# Patient Record
Sex: MALE | Race: WHITE | NOT HISPANIC OR LATINO | Employment: UNEMPLOYED | URBAN - NONMETROPOLITAN AREA
[De-identification: names, ages, dates, MRNs, and addresses within clinical notes are randomized per-mention and may not be internally consistent; named-entity substitution may affect disease eponyms.]

---

## 2017-02-06 RX ORDER — CLOPIDOGREL BISULFATE 75 MG/1
TABLET ORAL
Qty: 30 TABLET | Refills: 0 | Status: SHIPPED | OUTPATIENT
Start: 2017-02-06 | End: 2017-02-14

## 2017-02-14 ENCOUNTER — OFFICE VISIT (OUTPATIENT)
Dept: CARDIOLOGY | Facility: CLINIC | Age: 52
End: 2017-02-14

## 2017-02-14 VITALS
HEIGHT: 72 IN | SYSTOLIC BLOOD PRESSURE: 118 MMHG | DIASTOLIC BLOOD PRESSURE: 73 MMHG | RESPIRATION RATE: 16 BRPM | HEART RATE: 70 BPM | BODY MASS INDEX: 40.63 KG/M2 | WEIGHT: 300 LBS | OXYGEN SATURATION: 98 %

## 2017-02-14 DIAGNOSIS — I10 ESSENTIAL HYPERTENSION: Primary | ICD-10-CM

## 2017-02-14 DIAGNOSIS — I25.10 ATHEROSCLEROTIC CARDIOVASCULAR DISEASE: ICD-10-CM

## 2017-02-14 DIAGNOSIS — E78.5 DYSLIPIDEMIA: ICD-10-CM

## 2017-02-14 PROCEDURE — 93000 ELECTROCARDIOGRAM COMPLETE: CPT | Performed by: INTERNAL MEDICINE

## 2017-02-14 PROCEDURE — 99213 OFFICE O/P EST LOW 20 MIN: CPT | Performed by: INTERNAL MEDICINE

## 2017-02-14 RX ORDER — ASCORBIC ACID 500 MG
500 TABLET ORAL DAILY
COMMUNITY

## 2017-02-14 NOTE — PROGRESS NOTES
CLAIRE Richards  Eladio Stephenser  1965      Patient Active Problem List   Diagnosis   • Skin lesion of face   • Hyperkeratosis   • Atherosclerotic cardiovascular disease   • Hypertension. CONTIUNE CURRENT MEDICATIONS   • Dyslipidemia. ON PRAVASTATIN       Dear CLAIRE Richards:    Subjective     Eladio Guzman is a 51 y.o. male with the above medical problems who is here today for follow-up.  Going to the patient he has been doing well since his last visit and denies any chest pain, palpitations, orthopnea, PND, lower extremity edema, syncope or dizziness.  He does complain of some shortness of breath on exertion but states this is likely related to his weight gain and recent respiratory infection.  He states he has quit smoking at this point but has since gained several ounces of weight.  Advised him on the importance of weight control and techniques to prevent further weight gain.      Current Outpatient Prescriptions:   •  albuterol (PROVENTIL HFA;VENTOLIN HFA) 108 (90 BASE) MCG/ACT inhaler, Inhale 2 puffs every 4 (four) hours as needed for wheezing., Disp: , Rfl:   •  aspirin 81 MG EC tablet, Take 81 mg by mouth daily., Disp: , Rfl:   •  clopidogrel (PLAVIX) 75 MG tablet, TAKE ONE TABLET BY MOUTH ONCE DAILY, Disp: 30 tablet, Rfl: 0  •  isosorbide mononitrate (IMDUR) 60 MG 24 hr tablet, Take 60 mg by mouth daily., Disp: , Rfl:   •  levothyroxine (SYNTHROID, LEVOTHROID) 25 MCG tablet, Take 25 mcg by mouth daily., Disp: , Rfl:   •  lisinopril (PRINIVIL,ZESTRIL) 10 MG tablet, Take 10 mg by mouth daily., Disp: , Rfl:   •  metoprolol tartrate (LOPRESSOR) 50 MG tablet, Take 50 mg by mouth 2 (two) times a day., Disp: , Rfl:   •  nitroglycerin (NITROSTAT) 0.4 MG SL tablet, Place 0.4 mg under the tongue every 5 (five) minutes as needed for chest pain. Take no more than 3 doses in 15 minutes., Disp: , Rfl:   •  pravastatin (PRAVACHOL) 40 MG tablet, Take 40 mg by mouth daily., Disp: , Rfl:   •  ranitidine  (ZANTAC) 150 MG tablet, Take 150 mg by mouth 2 (two) times a day., Disp: , Rfl:   •  vitamin B-12 (CYANOCOBALAMIN) 1000 MCG tablet, Take 1,000 mcg by mouth daily., Disp: , Rfl:     The following portions of the patient's history were reviewed and updated as appropriate: allergies, current medications, past family history, past medical history, past social history, past surgical history and problem list.    Review of Systems   Constitution: Negative for chills, diaphoresis and fever.   HENT: Positive for congestion. Negative for headaches, nosebleeds and sore throat.    Cardiovascular: Positive for dyspnea on exertion. Negative for chest pain, leg swelling, orthopnea, palpitations and paroxysmal nocturnal dyspnea.   Respiratory: Negative for cough, hemoptysis, shortness of breath and wheezing.    Endocrine: Negative for cold intolerance and heat intolerance.   Hematologic/Lymphatic: Does not bruise/bleed easily.   Skin: Negative for rash.   Musculoskeletal: Positive for arthritis, joint pain, joint swelling and stiffness. Negative for myalgias.   Gastrointestinal: Negative for abdominal pain, constipation, diarrhea, hematemesis, hematochezia, melena, nausea and vomiting.   Genitourinary: Negative for dysuria and hematuria.   Neurological: Negative for dizziness, focal weakness and numbness.   Psychiatric/Behavioral: Negative for depression. The patient is not nervous/anxious.        Objective   There were no vitals taken for this visit.    Physical Exam   Constitutional: He is oriented to person, place, and time. He appears well-developed and well-nourished.   Obese WM sitting comfortably on chair.   HENT:   Mouth/Throat: Oropharynx is clear and moist.   Eyes: EOM are normal. Pupils are equal, round, and reactive to light.   Neck: Neck supple. No JVD present. No tracheal deviation present. No thyromegaly present.   Cardiovascular: Normal rate, regular rhythm, S1 normal and S2 normal.  Exam reveals no gallop and no  friction rub.    No murmur heard.  Pulmonary/Chest: Effort normal and breath sounds normal. No respiratory distress. He has no wheezes. He has no rales.   Abdominal: Soft. Bowel sounds are normal. He exhibits no mass. There is no tenderness.   Musculoskeletal: Normal range of motion. He exhibits no edema.   Lymphadenopathy:     He has no cervical adenopathy.   Neurological: He is alert and oriented to person, place, and time.   Skin: Skin is warm and dry. No rash noted.   Psychiatric: He has a normal mood and affect.       Procedures    Assessment/Plan     1.  ASCVD: Patient with history of ASCVD who currently remains asymptomatic.  He is on aspirin, Plavix, isosorbide mononitrate, beta blocker, ACE inhibitor and statin.  This point we'll continue current regimen.    2.  Dyslipidemia: Patient with a history of dyslipidemia currently on pravastatin.  At this point we will check lipid panel.    3.  Hypertension: Patient with history of hypertension with no recent kidney or liver function evaluation.  We will request CMP.    No diagnosis found.         No Follow-up on file.    I appreciate the opportunity to participate in this patient's cardiovascular care.    Best Regards    Herbert Balbuena

## 2017-02-14 NOTE — PROGRESS NOTES
CLAIRE Richards  Eladio Stephenser  1965      Patient Active Problem List   Diagnosis   • Skin lesion of face   • Hyperkeratosis   • Atherosclerotic cardiovascular disease   • Hypertension. CONTIUNE CURRENT MEDICATIONS   • Dyslipidemia. ON PRAVASTATIN       Dear CLAIRE Richards:    Zenaida Guzman is a 51 y.o. male with the above medical problems who is here today for follow-up.  According to the patient has been doing well since last visit.  He denies any chest pain, shortness of breath, palpitations, orthopnea or PND.  He does complain of occasional lower extremity edema that appears to be dependent.  He has a long history of hypertension which appears to be well-controlled on current regimen.  He also has a long history of coronary artery disease which has been stable since his last bypass.  He is not currently having any chest pains.  He has a history of dyslipidemia which is well controlled on pravastatin.        Current Outpatient Prescriptions:   •  albuterol (PROVENTIL HFA;VENTOLIN HFA) 108 (90 BASE) MCG/ACT inhaler, Inhale 2 puffs every 4 (four) hours as needed for wheezing., Disp: , Rfl:   •  aspirin 81 MG EC tablet, Take 81 mg by mouth daily., Disp: , Rfl:   •  isosorbide mononitrate (IMDUR) 60 MG 24 hr tablet, Take 60 mg by mouth daily., Disp: , Rfl:   •  levothyroxine (SYNTHROID, LEVOTHROID) 25 MCG tablet, Take 25 mcg by mouth daily., Disp: , Rfl:   •  lisinopril (PRINIVIL,ZESTRIL) 10 MG tablet, Take 10 mg by mouth daily., Disp: , Rfl:   •  metoprolol tartrate (LOPRESSOR) 50 MG tablet, Take 50 mg by mouth 2 (two) times a day., Disp: , Rfl:   •  nitroglycerin (NITROSTAT) 0.4 MG SL tablet, Place 0.4 mg under the tongue every 5 (five) minutes as needed for chest pain. Take no more than 3 doses in 15 minutes., Disp: , Rfl:   •  pravastatin (PRAVACHOL) 40 MG tablet, Take 40 mg by mouth daily., Disp: , Rfl:   •  ranitidine (ZANTAC) 150 MG tablet, Take 150 mg by mouth 2 (two)  "times a day., Disp: , Rfl:   •  vitamin C (ASCORBIC ACID) 500 MG tablet, Take 500 mg by mouth Daily., Disp: , Rfl:     The following portions of the patient's history were reviewed and updated as appropriate: allergies, current medications, past family history, past medical history, past social history, past surgical history and problem list.    Review of Systems   Constitution: Negative for chills, diaphoresis, fever and malaise/fatigue.   HENT: Negative for congestion, headaches, nosebleeds and sore throat.    Eyes: Negative for blurred vision.   Cardiovascular: Positive for dyspnea on exertion and leg swelling (bilat ankles in a.m. occasionally). Negative for chest pain, orthopnea, palpitations and paroxysmal nocturnal dyspnea.   Respiratory: Negative for cough, hemoptysis, shortness of breath and wheezing.    Endocrine: Negative for cold intolerance and heat intolerance.   Hematologic/Lymphatic: Bruises/bleeds easily.   Skin: Negative for rash.   Musculoskeletal: Positive for arthritis, joint pain, joint swelling and stiffness. Negative for myalgias.   Gastrointestinal: Negative for abdominal pain, constipation, diarrhea, hematemesis, hematochezia, melena, nausea and vomiting.   Genitourinary: Negative for dysuria and hematuria.   Neurological: Positive for dizziness ( only with lying flat). Negative for focal weakness, light-headedness and numbness.   Psychiatric/Behavioral: Negative for depression. The patient is not nervous/anxious.        Objective   Blood pressure 118/73, pulse 70, resp. rate 16, height 72\" (182.9 cm), weight 300 lb (136 kg), SpO2 98 %.    Physical Exam   Constitutional: He is oriented to person, place, and time. He appears well-developed and well-nourished.   Obese WM sitting comfortably on chair.   HENT:   Mouth/Throat: Oropharynx is clear and moist.   Eyes: EOM are normal. Pupils are equal, round, and reactive to light.   Neck: Neck supple. No JVD present. No tracheal deviation present. " No thyromegaly present.   Cardiovascular: Normal rate, regular rhythm, S1 normal and S2 normal.  Exam reveals no gallop and no friction rub.    No murmur heard.  Pulmonary/Chest: Effort normal and breath sounds normal. No respiratory distress. He has no wheezes. He has no rales.   Abdominal: Soft. Bowel sounds are normal. He exhibits no mass. There is no tenderness.   Musculoskeletal: Normal range of motion. He exhibits no edema.   Lymphadenopathy:     He has no cervical adenopathy.   Neurological: He is alert and oriented to person, place, and time.   Skin: Skin is warm and dry. No rash noted.   Psychiatric: He has a normal mood and affect.         ECG 12 Lead  Date/Time: 2/14/2017 12:22 PM  Performed by: CAMERON VALENTIN  Authorized by: CAMERON VALENTIN   Comparison: compared with previous ECG from 9/13/2016  Similar to previous ECG  Rhythm: sinus rhythm  Rate: normal  BPM: 73  Conduction: conduction normal  ST Segments: ST segments normal  T Waves: T waves normal  QRS axis: normal  Other: no other findings  Clinical impression: normal ECG            Assessment/Plan     1.  ASCVD: Patient with history of ASCVD who currently remains asymptomatic.  He is on aspirin, Plavix, isosorbide mononitrate, beta blocker, ACE inhibitor and statin.  Today the patient has if he could be taken off many of his current medications.  After a review of his medication list he has been on Plavix for 6 years after his coronary artery bypass surgery.  He has not had any recent chest pains and no ischemic events.  At this point will discontinue Plavix.     2.  Dyslipidemia: Patient with a history of dyslipidemia currently on pravastatin.  Will controlled on current regimen.    3.  Hypertension: The patient's blood pressure has been well-controlled on current regimen.  A CMP done after previous visit showed normal renal and liver function.  It showed minimally elevated glucose levels.  Advised the patient to  discuss this with his primary care provider.     Diagnosis Plan   1. Essential hypertension  ECG 12 Lead   2. Dyslipidemia. ON PRAVASTATIN     3. Atherosclerotic cardiovascular disease              Return in about 6 months (around 8/14/2017).    I appreciate the opportunity to participate in this patient's cardiovascular care.    Best Regards    Herbert Balbuena

## 2017-02-14 NOTE — PROGRESS NOTES
"Chey Valladares, CLAIRE  Eladio Stephenser  1965 02/14/2017    Patient Active Problem List   Diagnosis   • Skin lesion of face   • Hyperkeratosis   • Atherosclerotic cardiovascular disease   • Hypertension. CONTIUNE CURRENT MEDICATIONS   • Dyslipidemia. ON PRAVASTATIN       Dear Chey Valladares, APRN:    Subjective     Eladio Guzman is a 51 y.o. male with the above medical problems who {Specialty - why patient here?:[id number]}       Current Outpatient Prescriptions:   •  albuterol (PROVENTIL HFA;VENTOLIN HFA) 108 (90 BASE) MCG/ACT inhaler, Inhale 2 puffs every 4 (four) hours as needed for wheezing., Disp: , Rfl:   •  aspirin 81 MG EC tablet, Take 81 mg by mouth daily., Disp: , Rfl:   •  clopidogrel (PLAVIX) 75 MG tablet, TAKE ONE TABLET BY MOUTH ONCE DAILY, Disp: 30 tablet, Rfl: 0  •  isosorbide mononitrate (IMDUR) 60 MG 24 hr tablet, Take 60 mg by mouth daily., Disp: , Rfl:   •  levothyroxine (SYNTHROID, LEVOTHROID) 25 MCG tablet, Take 25 mcg by mouth daily., Disp: , Rfl:   •  lisinopril (PRINIVIL,ZESTRIL) 10 MG tablet, Take 10 mg by mouth daily., Disp: , Rfl:   •  metoprolol tartrate (LOPRESSOR) 50 MG tablet, Take 50 mg by mouth 2 (two) times a day., Disp: , Rfl:   •  nitroglycerin (NITROSTAT) 0.4 MG SL tablet, Place 0.4 mg under the tongue every 5 (five) minutes as needed for chest pain. Take no more than 3 doses in 15 minutes., Disp: , Rfl:   •  pravastatin (PRAVACHOL) 40 MG tablet, Take 40 mg by mouth daily., Disp: , Rfl:   •  ranitidine (ZANTAC) 150 MG tablet, Take 150 mg by mouth 2 (two) times a day., Disp: , Rfl:   •  vitamin C (ASCORBIC ACID) 500 MG tablet, Take 500 mg by mouth Daily., Disp: , Rfl:     {Common H&P Review Areas:59630}    ROS    Objective   Blood pressure 118/73, pulse 70, resp. rate 16, height 72\" (182.9 cm), weight 300 lb (136 kg), SpO2 98 %.    Physical Exam    Procedures    Assessment/Plan     No diagnosis found.         No Follow-up on file.    I appreciate the opportunity to " participate in this patient's cardiovascular care.    Best Regards    Herbert Balbuena

## 2017-02-27 RX ORDER — CLOPIDOGREL BISULFATE 75 MG/1
TABLET ORAL
Qty: 30 TABLET | Refills: 0 | OUTPATIENT
Start: 2017-02-27

## 2017-03-02 ENCOUNTER — TELEPHONE (OUTPATIENT)
Dept: CARDIOLOGY | Facility: CLINIC | Age: 52
End: 2017-03-02

## 2017-03-02 NOTE — TELEPHONE ENCOUNTER
Called pharmacy to advise them that Dr.Drago BASHIR Hendricks's plavix on 2.14.17. Spoke with Yisel and advised her she stated she would put this on his file.

## 2017-03-06 RX ORDER — CLOPIDOGREL BISULFATE 75 MG/1
TABLET ORAL
Qty: 30 TABLET | Refills: 0 | OUTPATIENT
Start: 2017-03-06

## 2017-07-10 RX ORDER — NITROGLYCERIN 0.4 MG/1
TABLET SUBLINGUAL
Qty: 25 TABLET | Refills: 1 | Status: SHIPPED | OUTPATIENT
Start: 2017-07-10 | End: 2017-09-14 | Stop reason: SDUPTHER

## 2017-08-22 ENCOUNTER — OFFICE VISIT (OUTPATIENT)
Dept: CARDIOLOGY | Facility: CLINIC | Age: 52
End: 2017-08-22

## 2017-08-22 VITALS
DIASTOLIC BLOOD PRESSURE: 73 MMHG | SYSTOLIC BLOOD PRESSURE: 113 MMHG | HEIGHT: 72 IN | RESPIRATION RATE: 16 BRPM | HEART RATE: 68 BPM | WEIGHT: 301.2 LBS | BODY MASS INDEX: 40.8 KG/M2

## 2017-08-22 DIAGNOSIS — E78.5 DYSLIPIDEMIA: ICD-10-CM

## 2017-08-22 DIAGNOSIS — I10 ESSENTIAL HYPERTENSION: ICD-10-CM

## 2017-08-22 DIAGNOSIS — I25.10 ATHEROSCLEROTIC CARDIOVASCULAR DISEASE: Primary | ICD-10-CM

## 2017-08-22 PROCEDURE — 93000 ELECTROCARDIOGRAM COMPLETE: CPT | Performed by: INTERNAL MEDICINE

## 2017-08-22 PROCEDURE — 99213 OFFICE O/P EST LOW 20 MIN: CPT | Performed by: INTERNAL MEDICINE

## 2017-08-22 RX ORDER — CHOLECALCIFEROL (VITAMIN D3) 125 MCG
5 CAPSULE ORAL NIGHTLY
COMMUNITY
End: 2018-08-01 | Stop reason: SDDI

## 2017-08-22 RX ORDER — MELATONIN
1000 DAILY
COMMUNITY

## 2017-08-22 NOTE — PROGRESS NOTES
CLAIRE Richards  BRITTANI Guzman  1965      Patient Active Problem List   Diagnosis   • Skin lesion of face   • Hyperkeratosis   • Atherosclerotic cardiovascular disease   • Hypertension. CONTIUNE CURRENT MEDICATIONS   • Dyslipidemia. ON PRAVASTATIN       Dear CLAIRE Richards:    Subjective     BRITTANI Guzman is a 52 y.o. male with the above medical problems who is here today for follow-up. According to the patient he has been doing fairly well since his last visit and denies any chest pain, shortness breath, palpitations, orthopnea, PND, lower extremity edema, dizziness or syncope.  He does have a history of hypertension which is fairly well controlled on current regimen.  He has a history of coronary artery disease but has remained asymptomatic and is felt to exercise without any problems.  He does appear to be a little deconditioned and has gained a few pounds over the before meals year.  He also has a history of dyslipidemia and has no recent lipid panel record.  However according to the patient he is to have labs done by his primary care provider in the near future.  Have asked him to send them over once they get done.      Current Outpatient Prescriptions:   •  albuterol (PROVENTIL HFA;VENTOLIN HFA) 108 (90 BASE) MCG/ACT inhaler, Inhale 2 puffs every 4 (four) hours as needed for wheezing., Disp: , Rfl:   •  aspirin 81 MG EC tablet, Take 81 mg by mouth daily., Disp: , Rfl:   •  cholecalciferol (VITAMIN D3) 1000 units tablet, Take 1,000 Units by mouth Daily., Disp: , Rfl:   •  isosorbide mononitrate (IMDUR) 60 MG 24 hr tablet, Take 60 mg by mouth daily., Disp: , Rfl:   •  levothyroxine (SYNTHROID, LEVOTHROID) 25 MCG tablet, Take 25 mcg by mouth daily., Disp: , Rfl:   •  lisinopril (PRINIVIL,ZESTRIL) 10 MG tablet, Take 10 mg by mouth daily., Disp: , Rfl:   •  melatonin 5 MG tablet tablet, Take 5 mg by mouth Every Night., Disp: , Rfl:   •  metoprolol tartrate (LOPRESSOR) 50 MG tablet,  "Take 50 mg by mouth 2 (two) times a day., Disp: , Rfl:   •  NITROSTAT 0.4 MG SL tablet, DISSOLVE ONE TABLET UNDER THE TONGUE EVERY 5 MINUTES AS NEEDED FOR CHEST PAIN.  DO NOT EXCEED A TOTAL OF 3 DOSES IN 15 MINUTES, Disp: 25 tablet, Rfl: 1  •  pravastatin (PRAVACHOL) 40 MG tablet, Take 40 mg by mouth daily., Disp: , Rfl:   •  ranitidine (ZANTAC) 150 MG tablet, Take 150 mg by mouth 2 (two) times a day., Disp: , Rfl:   •  vitamin C (ASCORBIC ACID) 500 MG tablet, Take 500 mg by mouth Daily., Disp: , Rfl:     The following portions of the patient's history were reviewed and updated as appropriate: allergies, current medications, past family history, past medical history, past social history, past surgical history and problem list.    Review of Systems   Constitution: Negative for chills, diaphoresis, fever, weakness and malaise/fatigue.   HENT: Negative for headaches, nosebleeds and sore throat.    Eyes: Negative for blurred vision, pain and redness.   Cardiovascular: Negative for chest pain, leg swelling, orthopnea, palpitations, paroxysmal nocturnal dyspnea and syncope.   Respiratory: Negative for cough, hemoptysis, shortness of breath and wheezing.    Endocrine: Negative for cold intolerance and heat intolerance.   Hematologic/Lymphatic: Does not bruise/bleed easily.   Skin: Negative for rash.   Musculoskeletal: Positive for arthritis, joint pain, joint swelling and stiffness. Negative for back pain, myalgias and neck pain.   Gastrointestinal: Negative for abdominal pain, constipation, diarrhea, hematemesis, hematochezia, melena, nausea and vomiting.   Genitourinary: Negative for dysuria and hematuria.   Neurological: Negative for dizziness, focal weakness and numbness.   Psychiatric/Behavioral: Negative for depression. The patient is not nervous/anxious.        Objective   Blood pressure 113/73, pulse 68, resp. rate 16, height 72\" (182.9 cm), weight (!) 301 lb 3.2 oz (137 kg).    Physical Exam   Constitutional: He " is oriented to person, place, and time. He appears well-developed and well-nourished.   Obese WM sitting comfortably on chair.   HENT:   Mouth/Throat: Oropharynx is clear and moist.   Eyes: EOM are normal. Pupils are equal, round, and reactive to light.   Neck: Neck supple. No JVD present. No tracheal deviation present. No thyromegaly present.   Cardiovascular: Normal rate, regular rhythm, S1 normal and S2 normal.  Exam reveals no gallop and no friction rub.    No murmur heard.  Pulmonary/Chest: Effort normal and breath sounds normal. No respiratory distress. He has no wheezes. He has no rales.   Abdominal: Soft. Bowel sounds are normal. He exhibits no mass. There is no tenderness.   Musculoskeletal: Normal range of motion. He exhibits no edema.   Lymphadenopathy:     He has no cervical adenopathy.   Neurological: He is alert and oriented to person, place, and time.   Skin: Skin is warm and dry. No rash noted.   Psychiatric: He has a normal mood and affect.         ECG 12 Lead  Date/Time: 8/22/2017 9:40 AM  Performed by: CAMERON VALENTIN  Authorized by: CAMERON VALENTIN   Comparison: compared with previous ECG from 2/14/2017  Similar to previous ECG  Rhythm: sinus rhythm  Rate: normal  BPM: 63  Conduction: incomplete RBBB  ST Segments: ST segments normal  T Waves: T waves normal  QRS axis: normal  Other: no other findings  Clinical impression: abnormal ECG            Assessment/Plan     1.  ASCVD: Patient with history of ASCVD who currently remains asymptomatic.  He is on aspirin, Plavix, isosorbide mononitrate, beta blocker, ACE inhibitor and statin.     2.  Hypertension: The patient's blood pressure has been well-controlled on current regimen.  Will obtain CMP per primary care physician.  At this point continue current regimen.      3.  Dyslipidemia: Patient with a history of dyslipidemia currently on pravastatin.  Will obtain lipid panel from his primary care physician.     Diagnosis  Plan   1. Atherosclerotic cardiovascular disease  ECG 12 Lead   2. Essential hypertension  ECG 12 Lead   3. Dyslipidemia. ON PRAVASTATIN              Return in about 6 months (around 2/22/2018).    I appreciate the opportunity to participate in this patient's cardiovascular care.    Best Regards    Hrebert Balbuena

## 2017-09-14 RX ORDER — NITROGLYCERIN 0.4 MG/1
TABLET SUBLINGUAL
Qty: 25 TABLET | Refills: 3 | Status: SHIPPED | OUTPATIENT
Start: 2017-09-14 | End: 2018-01-17 | Stop reason: SDUPTHER

## 2018-01-17 RX ORDER — NITROGLYCERIN 0.4 MG/1
TABLET SUBLINGUAL
Qty: 25 TABLET | Refills: 3 | Status: SHIPPED | OUTPATIENT
Start: 2018-01-17 | End: 2018-08-01 | Stop reason: SDUPTHER

## 2018-04-18 ENCOUNTER — OFFICE VISIT (OUTPATIENT)
Dept: CARDIOLOGY | Facility: CLINIC | Age: 53
End: 2018-04-18

## 2018-04-18 VITALS
BODY MASS INDEX: 40.88 KG/M2 | RESPIRATION RATE: 16 BRPM | SYSTOLIC BLOOD PRESSURE: 111 MMHG | HEIGHT: 72 IN | WEIGHT: 301.8 LBS | HEART RATE: 62 BPM | DIASTOLIC BLOOD PRESSURE: 70 MMHG

## 2018-04-18 DIAGNOSIS — E78.5 DYSLIPIDEMIA: ICD-10-CM

## 2018-04-18 DIAGNOSIS — I10 ESSENTIAL HYPERTENSION: ICD-10-CM

## 2018-04-18 DIAGNOSIS — I25.10 ATHEROSCLEROTIC CARDIOVASCULAR DISEASE: Primary | ICD-10-CM

## 2018-04-18 PROCEDURE — 99213 OFFICE O/P EST LOW 20 MIN: CPT | Performed by: INTERNAL MEDICINE

## 2018-04-18 PROCEDURE — 93000 ELECTROCARDIOGRAM COMPLETE: CPT | Performed by: INTERNAL MEDICINE

## 2018-04-18 NOTE — PROGRESS NOTES
CLAIRE Richards  BRITTANI Guzman  1965      Patient Active Problem List   Diagnosis   • Skin lesion of face   • Hyperkeratosis   • Atherosclerotic cardiovascular disease   • Hypertension. CONTIUNE CURRENT MEDICATIONS   • Dyslipidemia. ON PRAVASTATIN       Dear CLAIRE Richards:    Subjective     BRITTANI Guzman is a 52 y.o. male with the above medical problems who is here today for follow-up. The patient states he has continued to do well since his last visit.  He denies any chest pain, shortness of breath, palpitations, orthopnea, PND, lower extremity edema, dizziness or syncope.  He has a history of hypertension that remains well controlled on current regimen.  Has a history of coronary artery disease and remains asymptomatic.  His once again loosing some weight.        Current Outpatient Prescriptions:   •  albuterol (PROVENTIL HFA;VENTOLIN HFA) 108 (90 BASE) MCG/ACT inhaler, Inhale 2 puffs every 4 (four) hours as needed for wheezing., Disp: , Rfl:   •  aspirin 81 MG EC tablet, Take 81 mg by mouth daily., Disp: , Rfl:   •  cholecalciferol (VITAMIN D3) 1000 units tablet, Take 1,000 Units by mouth Daily., Disp: , Rfl:   •  isosorbide mononitrate (IMDUR) 60 MG 24 hr tablet, Take 60 mg by mouth daily., Disp: , Rfl:   •  levothyroxine (SYNTHROID, LEVOTHROID) 25 MCG tablet, Take 25 mcg by mouth daily., Disp: , Rfl:   •  lisinopril (PRINIVIL,ZESTRIL) 10 MG tablet, Take 10 mg by mouth daily., Disp: , Rfl:   •  melatonin 5 MG tablet tablet, Take 5 mg by mouth Every Night., Disp: , Rfl:   •  metoprolol tartrate (LOPRESSOR) 50 MG tablet, Take 50 mg by mouth 2 (two) times a day., Disp: , Rfl:   •  NITROSTAT 0.4 MG SL tablet, DISSOLVE ONE TABLET UNDER THE TONGUE EVERY 5 MINUTES AS NEEDED FOR CHEST PAIN.  DO NOT EXCEED A TOTAL OF 3 DOSES IN 15 MINUTES, Disp: 25 tablet, Rfl: 3  •  pravastatin (PRAVACHOL) 40 MG tablet, Take 40 mg by mouth daily., Disp: , Rfl:   •  ranitidine (ZANTAC) 150 MG tablet, Take  "150 mg by mouth 2 (two) times a day., Disp: , Rfl:   •  vitamin C (ASCORBIC ACID) 500 MG tablet, Take 500 mg by mouth Daily., Disp: , Rfl:     The following portions of the patient's history were reviewed and updated as appropriate: allergies, current medications, past family history, past medical history, past social history, past surgical history and problem list.    Review of Systems   Constitution: Negative for chills, diaphoresis and fever.   HENT: Negative for congestion, ear pain and sore throat.    Eyes: Negative for blurred vision, pain and redness.   Cardiovascular: Negative for chest pain, leg swelling, orthopnea, palpitations, paroxysmal nocturnal dyspnea and syncope.   Respiratory: Negative for cough, hemoptysis and shortness of breath.    Endocrine: Negative for cold intolerance and heat intolerance.   Hematologic/Lymphatic: Does not bruise/bleed easily.   Skin: Negative for rash.   Musculoskeletal: Positive for arthritis, joint pain and stiffness. Negative for back pain and myalgias.   Gastrointestinal: Negative for abdominal pain, constipation, diarrhea, hematemesis, hematochezia, melena and vomiting.   Genitourinary: Negative for dysuria and hematuria.   Neurological: Positive for dizziness. Negative for focal weakness and numbness.   Psychiatric/Behavioral: Negative for depression. The patient is not nervous/anxious.        Objective   Blood pressure 111/70, pulse 62, resp. rate 16, height 182.9 cm (72.01\"), weight (!) 137 kg (301 lb 12.8 oz).    Physical Exam   Constitutional: He is oriented to person, place, and time. He appears well-developed and well-nourished.   Obese WM sitting comfortably on chair.   HENT:   Mouth/Throat: Oropharynx is clear and moist.   Eyes: EOM are normal. Pupils are equal, round, and reactive to light.   Neck: Neck supple. No JVD present. No tracheal deviation present. No thyromegaly present.   Cardiovascular: Normal rate, regular rhythm, S1 normal and S2 normal.  Exam " reveals no gallop and no friction rub.    No murmur heard.  Pulmonary/Chest: Effort normal and breath sounds normal. No respiratory distress. He has no wheezes. He has no rales.   Abdominal: Soft. Bowel sounds are normal. He exhibits no mass. There is no tenderness.   Musculoskeletal: Normal range of motion. He exhibits no edema.   Lymphadenopathy:     He has no cervical adenopathy.   Neurological: He is alert and oriented to person, place, and time.   Skin: Skin is warm and dry. No rash noted.   Psychiatric: He has a normal mood and affect.         ECG 12 Lead  Date/Time: 4/18/2018 8:54 AM  Performed by: CAMERON VALENTIN  Authorized by: CAMERON VALENTIN   Comparison: compared with previous ECG from 8/22/2017  Similar to previous ECG  Rhythm: sinus rhythm  Rate: normal  BPM: 62  Conduction: non-specific intraventricular conduction delay  ST Segments: ST segments normal  T Waves: T waves normal  QRS axis: normal  Other: no other findings  Clinical impression: non-specific ECG            Assessment/Plan     1.  ASCVD: Patient with history of ASCVD who currently remains asymptomatic.  He is on aspirin, isosorbide mononitrate, beta blocker, ACE inhibitor and statin.  At this point we'll continue current regimen.    2.  Hypertension: The patient's blood pressure has been well-controlled on current regimen.  We will continue current medications.    3.  Dyslipidemia: Patient with a history of dyslipidemia currently on pravastatin.  His last lipid panel shows adequate lipid control.  At this point will continue.     Diagnosis Plan   1. Atherosclerotic cardiovascular disease     2. Essential hypertension     3. Dyslipidemia. ON PRAVASTATIN              Return in about 3 months (around 7/18/2018).    I appreciate the opportunity to participate in this patient's cardiovascular care.    Best Regards    Cameron Balbuena

## 2018-08-01 ENCOUNTER — OFFICE VISIT (OUTPATIENT)
Dept: CARDIOLOGY | Facility: CLINIC | Age: 53
End: 2018-08-01

## 2018-08-01 VITALS
SYSTOLIC BLOOD PRESSURE: 101 MMHG | BODY MASS INDEX: 39.68 KG/M2 | DIASTOLIC BLOOD PRESSURE: 72 MMHG | RESPIRATION RATE: 16 BRPM | HEART RATE: 70 BPM | HEIGHT: 72 IN | WEIGHT: 293 LBS

## 2018-08-01 DIAGNOSIS — E78.5 DYSLIPIDEMIA: ICD-10-CM

## 2018-08-01 DIAGNOSIS — I25.10 ATHEROSCLEROTIC CARDIOVASCULAR DISEASE: Primary | ICD-10-CM

## 2018-08-01 DIAGNOSIS — I10 ESSENTIAL HYPERTENSION: ICD-10-CM

## 2018-08-01 PROCEDURE — 99213 OFFICE O/P EST LOW 20 MIN: CPT | Performed by: PHYSICIAN ASSISTANT

## 2018-08-01 RX ORDER — NITROGLYCERIN 0.4 MG/1
0.4 TABLET SUBLINGUAL
Qty: 25 TABLET | Refills: 3 | Status: SHIPPED | OUTPATIENT
Start: 2018-08-01

## 2018-08-01 NOTE — PROGRESS NOTES
CLAIRE Potts  BRITTANI Guzman  1965 08/01/2018    Patient Active Problem List   Diagnosis   • Skin lesion of face   • Hyperkeratosis   • Atherosclerotic cardiovascular disease   • Hypertension. CONTIUNE CURRENT MEDICATIONS   • Dyslipidemia. ON PRAVASTATIN       Dear CLAIRE Potts:    Subjective       History of Present Illness:    Chief Compliant: follow up for ASCVD    BRITTANI Guzman is a pleasant 53 y.o. male with a past medical history significant for ASCVD status post CABG, essential hypertension, and dyslipidemia.  Patient comes in for cardiology follow-up.  Patient states that very well.  He is adjusting his diet and he is completely cut out all soda and all bread.  Since then he is lost 10 pounds by our records in 3 months his blood pressure is also improved from 115 systolic to 101 systolic.  He has not had any symptoms since we last saw him he denies chest pain, palpitations, dizziness, shortness of breath, or syncope.  He does state when he wakes up in the morning his fingers hurt but after an hour working with a typically get better.  We did receive his recent blood work from his primary that showed an LDL of 68 with normal creatinine and potassium.      No Known Allergies:      Current Outpatient Prescriptions:   •  albuterol (PROVENTIL HFA;VENTOLIN HFA) 108 (90 BASE) MCG/ACT inhaler, Inhale 2 puffs every 4 (four) hours as needed for wheezing., Disp: , Rfl:   •  aspirin 81 MG EC tablet, Take 81 mg by mouth daily., Disp: , Rfl:   •  isosorbide mononitrate (IMDUR) 60 MG 24 hr tablet, Take 60 mg by mouth daily., Disp: , Rfl:   •  levothyroxine (SYNTHROID, LEVOTHROID) 25 MCG tablet, Take 25 mcg by mouth daily., Disp: , Rfl:   •  lisinopril (PRINIVIL,ZESTRIL) 10 MG tablet, Take 10 mg by mouth daily., Disp: , Rfl:   •  metoprolol tartrate (LOPRESSOR) 50 MG tablet, Take 50 mg by mouth 2 (two) times a day., Disp: , Rfl:   •  NITROSTAT 0.4 MG SL tablet, DISSOLVE ONE TABLET UNDER  "THE TONGUE EVERY 5 MINUTES AS NEEDED FOR CHEST PAIN.  DO NOT EXCEED A TOTAL OF 3 DOSES IN 15 MINUTES, Disp: 25 tablet, Rfl: 3  •  pravastatin (PRAVACHOL) 40 MG tablet, Take 40 mg by mouth daily., Disp: , Rfl:   •  ranitidine (ZANTAC) 150 MG tablet, Take 150 mg by mouth 2 (two) times a day., Disp: , Rfl:   •  vitamin C (ASCORBIC ACID) 500 MG tablet, Take 500 mg by mouth Daily., Disp: , Rfl:   •  cholecalciferol (VITAMIN D3) 1000 units tablet, Take 1,000 Units by mouth Daily., Disp: , Rfl:   •  melatonin 5 MG tablet tablet, Take 5 mg by mouth Every Night., Disp: , Rfl:       The following portions of the patient's history were reviewed and updated as appropriate: allergies, current medications, past family history, past medical history, past social history, past surgical history and problem list.    Social History   Substance Use Topics   • Smoking status: Former Smoker     Packs/day: 1.00     Years: 20.00     Types: Cigarettes     Quit date: 2014   • Smokeless tobacco: Current User     Types: Snuff   • Alcohol use No       Review of Systems   Cardiovascular: Positive for dyspnea on exertion. Negative for chest pain, leg swelling and palpitations.   Respiratory: Negative for shortness of breath.        Objective   Vitals:    08/01/18 1144   BP: 101/72   Pulse: 70   Resp: 16   Weight: 133 kg (293 lb)   Height: 182.9 cm (72\")     Body mass index is 39.74 kg/m².        Physical Exam   Constitutional: He is oriented to person, place, and time. He appears well-developed and well-nourished. No distress.   Cardiovascular: Normal rate, regular rhythm and normal heart sounds.  Exam reveals no gallop and no friction rub.    No murmur heard.  Pulmonary/Chest: Effort normal and breath sounds normal. No respiratory distress. He has no wheezes. He has no rales.   Musculoskeletal: He exhibits no edema.   Neurological: He is alert and oriented to person, place, and time.   Skin: He is not diaphoretic.       Lab Results   Component " Value Date     10/03/2016    K 4.6 10/03/2016     10/03/2016    CO2 25 10/03/2016    BUN 10 10/03/2016    CREATININE 0.89 10/03/2016    GLUCOSE 120 (H) 07/25/2014    CALCIUM 8.8 10/03/2016    AST 19 10/03/2016    ALT 26 10/03/2016    ALKPHOS 90 10/03/2016    LABIL2 1.6 10/03/2016     No results found for: CKTOTAL  Lab Results   Component Value Date    WBC 6.0 07/25/2014    HGB 15.3 07/25/2014    HCT 44.1 07/25/2014     07/25/2014     No results found for: INR  No results found for: MG  Lab Results   Component Value Date    CHLPL 135 10/03/2016    TRIG 87 10/03/2016    HDL 42 10/03/2016    LDL 76 10/03/2016      No results found for: BNP        Procedures      Assessment/Plan    Diagnosis Plan   1. Atherosclerotic cardiovascular disease     2. Essential hypertension     3. Dyslipidemia. ON PRAVASTATIN                  Recommendations:  1.  I praised the patient for his dedication to exercise and changing his diet.  I encouraged him to continue doing so and he will continue see the benefits which include better blood pressure.  I did offer to cut back on his Imdur since his blood pressure is low today however he feels like he would still like to take it for now. I encouraged him to keep close eye on his blood pressure and sugar dropped below 100 or he becomes symptomatic that he stop and call us.  2. Continue pravastatin, lisinopril, metoprolol, and Imdur for now.  3. Follow-up in 9 months    Return in about 9 months (around 5/1/2019).    As always, I appreciate very much the opportunity to participate in the cardiovascular care of your patients.      With Best Regards,    KALEIGH Cook disclaimer:  Much of this encounter note is an electronic transcription/translation of spoken language to printed text. The electronic translation of spoken language may permit erroneous, or at times, nonsensical words or phrases to be inadvertently transcribed; Although I have reviewed the note for  such errors, some may still exist.

## 2020-06-03 ENCOUNTER — OFFICE VISIT (OUTPATIENT)
Dept: CARDIOLOGY | Facility: CLINIC | Age: 55
End: 2020-06-03

## 2020-06-03 VITALS
OXYGEN SATURATION: 98 % | HEART RATE: 74 BPM | TEMPERATURE: 98.8 F | BODY MASS INDEX: 37.84 KG/M2 | WEIGHT: 279.4 LBS | SYSTOLIC BLOOD PRESSURE: 121 MMHG | DIASTOLIC BLOOD PRESSURE: 75 MMHG | HEIGHT: 72 IN

## 2020-06-03 DIAGNOSIS — E78.5 DYSLIPIDEMIA: ICD-10-CM

## 2020-06-03 DIAGNOSIS — I10 ESSENTIAL HYPERTENSION: ICD-10-CM

## 2020-06-03 DIAGNOSIS — I25.10 ATHEROSCLEROTIC CARDIOVASCULAR DISEASE: Primary | ICD-10-CM

## 2020-06-03 PROCEDURE — 99213 OFFICE O/P EST LOW 20 MIN: CPT | Performed by: PHYSICIAN ASSISTANT

## 2020-06-03 PROCEDURE — 93000 ELECTROCARDIOGRAM COMPLETE: CPT | Performed by: PHYSICIAN ASSISTANT

## 2020-06-03 RX ORDER — LISINOPRIL 20 MG/1
20 TABLET ORAL DAILY
COMMUNITY
Start: 2020-03-21

## 2020-06-03 RX ORDER — LEVOTHYROXINE SODIUM 0.1 MG/1
100 TABLET ORAL DAILY
COMMUNITY
Start: 2020-04-21 | End: 2021-06-04 | Stop reason: DRUGHIGH

## 2020-06-03 NOTE — PROGRESS NOTES
Mellissa Serrano APRN  BRITTANI Guzman  1965 06/03/2020    Patient Active Problem List   Diagnosis   • Skin lesion of face   • Hyperkeratosis   • Atherosclerotic cardiovascular disease   • Essential hypertension   • Dyslipidemia. ON PRAVASTATIN       Dear Mellissa Serrano APRN:    Subjective     History of Present Illness:    Chief Complaint   Patient presents with   • ASCVD   • Hypertension   • Dyslipidemia       BRITTANI Guzman is a pleasant 54 y.o. male with a past medical history significant for history of ASCVD status post CABG, dyslipidemia, essential hypertension.  He comes in today for routine cardiology follow-up.    Patient reports he has been doing great since he was last seen.  He reports he still tries to walk daily and also goes raccoon hunting and reports he typically walks 2 to 3 miles when he does this through the woods.  He does deny any chest pain since he was last seen, shortness of breath, palpitations, dizziness, or syncope.          No Known Allergies:      Current Outpatient Medications:   •  albuterol (PROVENTIL HFA;VENTOLIN HFA) 108 (90 BASE) MCG/ACT inhaler, Inhale 2 puffs every 4 (four) hours as needed for wheezing., Disp: , Rfl:   •  aspirin 81 MG EC tablet, Take 81 mg by mouth daily., Disp: , Rfl:   •  cholecalciferol (VITAMIN D3) 1000 units tablet, Take 1,000 Units by mouth Daily., Disp: , Rfl:   •  levothyroxine (SYNTHROID, LEVOTHROID) 100 MCG tablet, Take 100 mcg by mouth Daily., Disp: , Rfl:   •  lisinopril (PRINIVIL,ZESTRIL) 20 MG tablet, Take 20 mg by mouth Daily., Disp: , Rfl:   •  metoprolol tartrate (LOPRESSOR) 50 MG tablet, Take 50 mg by mouth 2 (two) times a day., Disp: , Rfl:   •  pravastatin (PRAVACHOL) 40 MG tablet, Take 40 mg by mouth daily., Disp: , Rfl:   •  ranitidine (ZANTAC) 150 MG tablet, Take 150 mg by mouth 2 (two) times a day., Disp: , Rfl:   •  vitamin C (ASCORBIC ACID) 500 MG tablet, Take 500 mg by mouth Daily., Disp: , Rfl:   •  isosorbide  "mononitrate (IMDUR) 60 MG 24 hr tablet, Take 60 mg by mouth daily., Disp: , Rfl:   •  levothyroxine (SYNTHROID, LEVOTHROID) 25 MCG tablet, Take 25 mcg by mouth daily., Disp: , Rfl:   •  lisinopril (PRINIVIL,ZESTRIL) 10 MG tablet, Take 10 mg by mouth daily., Disp: , Rfl:   •  nitroglycerin (NITROSTAT) 0.4 MG SL tablet, Place 1 tablet under the tongue Every 5 (Five) Minutes As Needed for Chest Pain. Take no more than 3 doses in 15 minutes., Disp: 25 tablet, Rfl: 3    The following portions of the patient's history were reviewed and updated as appropriate: allergies, current medications, past family history, past medical history, past social history, past surgical history and problem list.    Social History     Tobacco Use   • Smoking status: Former Smoker     Packs/day: 1.00     Years: 20.00     Pack years: 20.00     Types: Cigarettes     Last attempt to quit:      Years since quittin.4   • Smokeless tobacco: Current User     Types: Snuff   Substance Use Topics   • Alcohol use: No   • Drug use: No       Review of Systems   Constitution: Negative for malaise/fatigue.   Cardiovascular: Negative for chest pain, dyspnea on exertion and irregular heartbeat.   Respiratory: Negative for cough and shortness of breath.    Hematologic/Lymphatic: Negative for bleeding problem. Does not bruise/bleed easily.   Gastrointestinal: Negative for nausea and vomiting.   Neurological: Negative for weakness.       Objective   Vitals:    20 0954   BP: 121/75   BP Location: Right arm   Pulse: 74   Temp: 98.8 °F (37.1 °C)   SpO2: 98%   Weight: 127 kg (279 lb 6.4 oz)   Height: 182.9 cm (72\")     Body mass index is 37.89 kg/m².    Physical Exam   Constitutional: He is oriented to person, place, and time. He appears well-developed and well-nourished. No distress.   HENT:   Head: Normocephalic and atraumatic.   Cardiovascular: Normal rate, regular rhythm and normal heart sounds.   Pulmonary/Chest: Effort normal and breath sounds " normal. No respiratory distress.   Musculoskeletal: He exhibits no edema.   Neurological: He is alert and oriented to person, place, and time.   Skin: He is not diaphoretic.       Lab Results   Component Value Date     10/03/2016    K 4.6 10/03/2016     10/03/2016    CO2 25 10/03/2016    BUN 10 10/03/2016    CREATININE 0.89 10/03/2016    GLUCOSE 120 (H) 07/25/2014    CALCIUM 8.8 10/03/2016    AST 19 10/03/2016    ALT 26 10/03/2016    ALKPHOS 90 10/03/2016    LABIL2 1.6 10/03/2016     No results found for: CKTOTAL  Lab Results   Component Value Date    WBC 6.0 07/25/2014    HGB 15.3 07/25/2014    HCT 44.1 07/25/2014     07/25/2014     No results found for: INR  No results found for: MG  Lab Results   Component Value Date    CHLPL 135 10/03/2016    TRIG 87 10/03/2016    HDL 42 10/03/2016    LDL 76 10/03/2016      No results found for: BNP    During this visit the following were done:  Labs Reviewed [x]    Labs Ordered []    Radiology Reports Reviewed [x]    Radiology Ordered []    PCP Records Reviewed []    Referring Provider Records Reviewed []    ER Records Reviewed []    Hospital Records Reviewed []    History Obtained From Family []    Radiology Images Reviewed []    Other Reviewed []    Records Requested []         ECG 12 Lead  Date/Time: 6/3/2020 9:59 AM  Performed by: Lucian Catry PA-C  Authorized by: Lucian Carty PA-C   Comparison: compared with previous ECG   Similar to previous ECG  Rhythm: sinus rhythm  Conduction: conduction normal  Q waves: V1      Clinical impression: normal ECG          Assessment/Plan    Diagnosis Plan   1. Atherosclerotic cardiovascular disease     2. Dyslipidemia. ON PRAVASTATIN     3. Essential hypertension          Recommendations:  1. Mr. Nowak appears stable from cardiac standpoint he is asymptomatic with good blood pressure control encouraged him to continue daily exercise which he reports he plans on continuing he has lost roughly 30 pounds thus  far.  Continue pravastatin, lisinopril, metoprolol, and aspirin.    Return in about 1 year (around 6/3/2021).    As always, I appreciate very much the opportunity to participate in the cardiovascular care of your patients.      With Best Regards,    Lucian Carty PA-C

## 2021-06-04 ENCOUNTER — TELEPHONE (OUTPATIENT)
Dept: CARDIOLOGY | Facility: CLINIC | Age: 56
End: 2021-06-04

## 2021-06-04 ENCOUNTER — OFFICE VISIT (OUTPATIENT)
Dept: CARDIOLOGY | Facility: CLINIC | Age: 56
End: 2021-06-04

## 2021-06-04 VITALS
TEMPERATURE: 98.2 F | HEART RATE: 73 BPM | OXYGEN SATURATION: 100 % | WEIGHT: 293 LBS | SYSTOLIC BLOOD PRESSURE: 113 MMHG | BODY MASS INDEX: 39.68 KG/M2 | DIASTOLIC BLOOD PRESSURE: 76 MMHG | HEIGHT: 72 IN

## 2021-06-04 DIAGNOSIS — I25.10 ATHEROSCLEROTIC CARDIOVASCULAR DISEASE: Primary | ICD-10-CM

## 2021-06-04 DIAGNOSIS — E78.5 DYSLIPIDEMIA: ICD-10-CM

## 2021-06-04 DIAGNOSIS — I10 ESSENTIAL HYPERTENSION: ICD-10-CM

## 2021-06-04 PROCEDURE — 93000 ELECTROCARDIOGRAM COMPLETE: CPT | Performed by: PHYSICIAN ASSISTANT

## 2021-06-04 PROCEDURE — 99213 OFFICE O/P EST LOW 20 MIN: CPT | Performed by: PHYSICIAN ASSISTANT

## 2021-06-04 RX ORDER — FAMOTIDINE 20 MG/1
20 TABLET, FILM COATED ORAL DAILY
COMMUNITY
Start: 2021-05-27

## 2021-06-04 NOTE — PROGRESS NOTES
Mellissa Serrano APRN  BRITTANI Guzman  1965 06/04/2021    Patient Active Problem List   Diagnosis   • Skin lesion of face   • Hyperkeratosis   • Atherosclerotic cardiovascular disease   • Essential hypertension   • Dyslipidemia. ON PRAVASTATIN       Dear Mellissa Serrano APRN:    Subjective     History of Present Illness:    Chief Complaint   Patient presents with   • Med management     verbal       BRITTANI Guzman is a pleasant 55 y.o. male with a past medical history significant for ASCVD status post CABG, dyslipidemia, essential hypertension.  He comes in today for routine cardiology follow-up.    Mr. Guzman reports overall he has been doing well and has no complaints with open questions. Patient denies any chest pain, shortness of breath, palpitations, dizziness, syncope or near syncope.     No Known Allergies:      Current Outpatient Medications:   •  albuterol (PROVENTIL HFA;VENTOLIN HFA) 108 (90 BASE) MCG/ACT inhaler, Inhale 2 puffs every 4 (four) hours as needed for wheezing., Disp: , Rfl:   •  aspirin 81 MG EC tablet, Take 81 mg by mouth daily., Disp: , Rfl:   •  cholecalciferol (VITAMIN D3) 1000 units tablet, Take 1,000 Units by mouth Daily., Disp: , Rfl:   •  famotidine (PEPCID) 20 MG tablet, Take 20 mg by mouth Daily., Disp: , Rfl:   •  isosorbide mononitrate (IMDUR) 60 MG 24 hr tablet, Take 60 mg by mouth daily., Disp: , Rfl:   •  levothyroxine (SYNTHROID, LEVOTHROID) 25 MCG tablet, Take 25 mcg by mouth daily., Disp: , Rfl:   •  lisinopril (PRINIVIL,ZESTRIL) 20 MG tablet, Take 20 mg by mouth Daily., Disp: , Rfl:   •  metoprolol tartrate (LOPRESSOR) 50 MG tablet, Take 50 mg by mouth 2 (two) times a day., Disp: , Rfl:   •  nitroglycerin (NITROSTAT) 0.4 MG SL tablet, Place 1 tablet under the tongue Every 5 (Five) Minutes As Needed for Chest Pain. Take no more than 3 doses in 15 minutes., Disp: 25 tablet, Rfl: 3  •  pravastatin (PRAVACHOL) 40 MG tablet, Take 40 mg by mouth daily., Disp: , Rfl:  "  •  vitamin C (ASCORBIC ACID) 500 MG tablet, Take 500 mg by mouth Daily., Disp: , Rfl:     The following portions of the patient's history were reviewed and updated as appropriate: allergies, current medications, past family history, past medical history, past social history, past surgical history and problem list.    Social History     Tobacco Use   • Smoking status: Former Smoker     Packs/day: 1.00     Years: 20.00     Pack years: 20.00     Types: Cigarettes     Quit date:      Years since quittin.4   • Smokeless tobacco: Current User     Types: Snuff   Substance Use Topics   • Alcohol use: No   • Drug use: No         Objective   Vitals:    21 0851   BP: 113/76   BP Location: Left arm   Patient Position: Sitting   Cuff Size: Adult   Pulse: 73   Temp: 98.2 °F (36.8 °C)   SpO2: 100%   Weight: 133 kg (293 lb)   Height: 182.9 cm (72\")     Body mass index is 39.74 kg/m².    Constitutional:       General: Not in acute distress.     Appearance: Healthy appearance. Well-developed and not in distress. Not diaphoretic.   Eyes:      Conjunctiva/sclera: Conjunctivae normal.      Pupils: Pupils are equal, round, and reactive to light.   HENT:      Head: Normocephalic and atraumatic.   Neck:      Vascular: No carotid bruit or JVD.   Pulmonary:      Effort: Pulmonary effort is normal. No respiratory distress.      Breath sounds: Normal breath sounds.   Cardiovascular:      Normal rate. Regular rhythm.   Skin:     General: Skin is cool.   Neurological:      Mental Status: Alert, oriented to person, place, and time and oriented to person, place and time.         Lab Results   Component Value Date     10/03/2016    K 4.6 10/03/2016     10/03/2016    CO2 25 10/03/2016    BUN 10 10/03/2016    CREATININE 0.89 10/03/2016    GLUCOSE 120 (H) 2014    CALCIUM 8.8 10/03/2016    AST 19 10/03/2016    ALT 26 10/03/2016    ALKPHOS 90 10/03/2016    LABIL2 1.6 10/03/2016     No results found for: CKTOTAL  Lab " Results   Component Value Date    WBC 6.0 07/25/2014    HGB 15.3 07/25/2014    HCT 44.1 07/25/2014     07/25/2014     No results found for: INR  No results found for: MG  Lab Results   Component Value Date    CHLPL 135 10/03/2016    TRIG 87 10/03/2016    HDL 42 10/03/2016    LDL 76 10/03/2016      No results found for: BNP    During this visit the following were done:  Labs Reviewed [x]    Labs Ordered []    Radiology Reports Reviewed []    Radiology Ordered []    PCP Records Reviewed []    Referring Provider Records Reviewed []    ER Records Reviewed []    Hospital Records Reviewed []    History Obtained From Family []    Radiology Images Reviewed []    Other Reviewed []    Records Requested []         ECG 12 Lead    Date/Time: 6/4/2021 8:43 AM  Performed by: Lucian Carty PA-C  Authorized by: Lucian Carty PA-C   Comparison: compared with previous ECG   Similar to previous ECG  Rhythm: sinus rhythm  Conduction: non-specific intraventricular conduction delay  ST Segments: ST segments normal  Other findings: early repolarization    Clinical impression: normal ECG             Assessment/Plan    Diagnosis Plan   1. Atherosclerotic cardiovascular disease     2. Dyslipidemia. ON PRAVASTATIN     3. Essential hypertension              Recommendations:  1. ASCVD  1. Clinically stable and asymptomatic.  I will continue aspirin, Imdur, lisinopril, metoprolol, and pravastatin.  2. I did request recent blood work from PCP.      No follow-ups on file.    As always, I appreciate very much the opportunity to participate in the cardiovascular care of your patients.      With Best Regards,    Lucian Carty PA-C

## 2021-06-04 NOTE — TELEPHONE ENCOUNTER
----- Message from Lucian Carty PA-C sent at 6/4/2021  9:50 AM EDT -----  Can we get recent blood work from pcp?       requested

## 2021-06-04 NOTE — TELEPHONE ENCOUNTER
----- Message from Lucian Carty PA-C sent at 6/4/2021  9:50 AM EDT -----  Can we get recent blood work from pcp?

## 2022-06-06 ENCOUNTER — TELEPHONE (OUTPATIENT)
Dept: CARDIOLOGY | Facility: CLINIC | Age: 57
End: 2022-06-06

## 2022-06-06 ENCOUNTER — OFFICE VISIT (OUTPATIENT)
Dept: CARDIOLOGY | Facility: CLINIC | Age: 57
End: 2022-06-06

## 2022-06-06 VITALS
WEIGHT: 298.8 LBS | DIASTOLIC BLOOD PRESSURE: 71 MMHG | BODY MASS INDEX: 40.47 KG/M2 | HEART RATE: 63 BPM | SYSTOLIC BLOOD PRESSURE: 112 MMHG | OXYGEN SATURATION: 97 % | TEMPERATURE: 97.1 F | HEIGHT: 72 IN

## 2022-06-06 DIAGNOSIS — I10 ESSENTIAL HYPERTENSION: ICD-10-CM

## 2022-06-06 DIAGNOSIS — E78.5 DYSLIPIDEMIA: ICD-10-CM

## 2022-06-06 DIAGNOSIS — I25.10 ATHEROSCLEROTIC CARDIOVASCULAR DISEASE: Primary | ICD-10-CM

## 2022-06-06 PROCEDURE — 99213 OFFICE O/P EST LOW 20 MIN: CPT | Performed by: PHYSICIAN ASSISTANT

## 2022-06-06 PROCEDURE — 93000 ELECTROCARDIOGRAM COMPLETE: CPT | Performed by: PHYSICIAN ASSISTANT

## 2022-06-06 RX ORDER — METFORMIN HYDROCHLORIDE 500 MG/1
TABLET, EXTENDED RELEASE ORAL
COMMUNITY
Start: 2022-05-19

## 2022-06-06 NOTE — PROGRESS NOTES
Mellissa Serrano APRN  Eliu Guzman  1965 06/06/2022    Patient Active Problem List   Diagnosis   • Skin lesion of face   • Hyperkeratosis   • Atherosclerotic cardiovascular disease   • Essential hypertension   • Dyslipidemia. ON PRAVASTATIN       Dear Mellissa Serrano APRN:    Subjective     History of Present Illness:    Chief Complaint   Patient presents with   • Follow-up     1 year   • Med Management     bottles       Eliu Guzman is a pleasant 56 y.o. male with a past medical history significant for ASCVD status post CABG, dyslipidemia, essential hypertension.  He comes in today for routine cardiology follow-up.    Mr. Guzman reports that he was recently diagnosed with diabetes mellitus and was initiated on metformin by his PCP.  He does report his A1c was only minimally elevated.  Clinically he does deny any chest pains, shortness of breath, palpitations, dizziness, or syncope.  Blood pressure is well controlled in the office today.    No Known Allergies:      Current Outpatient Medications:   •  albuterol (PROVENTIL HFA;VENTOLIN HFA) 108 (90 BASE) MCG/ACT inhaler, Inhale 2 puffs every 4 (four) hours as needed for wheezing., Disp: , Rfl:   •  aspirin 81 MG EC tablet, Take 81 mg by mouth daily., Disp: , Rfl:   •  cholecalciferol (VITAMIN D3) 1000 units tablet, Take 1,000 Units by mouth Daily., Disp: , Rfl:   •  famotidine (PEPCID) 20 MG tablet, Take 20 mg by mouth Daily., Disp: , Rfl:   •  levothyroxine (SYNTHROID, LEVOTHROID) 25 MCG tablet, Take 25 mcg by mouth daily., Disp: , Rfl:   •  lisinopril (PRINIVIL,ZESTRIL) 20 MG tablet, Take 20 mg by mouth Daily., Disp: , Rfl:   •  metFORMIN ER (GLUCOPHAGE-XR) 500 MG 24 hr tablet, TAKE ONE TABLET BY MOUTH EVERY NIGHT AT BEDTIME FOR BLOOD SUGAR, Disp: , Rfl:   •  metoprolol tartrate (LOPRESSOR) 50 MG tablet, Take 50 mg by mouth 2 (two) times a day., Disp: , Rfl:   •  nitroglycerin (NITROSTAT) 0.4 MG SL tablet, Place 1 tablet under the tongue Every 5  "(Five) Minutes As Needed for Chest Pain. Take no more than 3 doses in 15 minutes., Disp: 25 tablet, Rfl: 3  •  pravastatin (PRAVACHOL) 40 MG tablet, Take 40 mg by mouth daily., Disp: , Rfl:   •  vitamin C (ASCORBIC ACID) 500 MG tablet, Take 500 mg by mouth Daily., Disp: , Rfl:     The following portions of the patient's history were reviewed and updated as appropriate: allergies, current medications, past family history, past medical history, past social history, past surgical history and problem list.    Social History     Tobacco Use   • Smoking status: Former Smoker     Packs/day: 1.00     Years: 20.00     Pack years: 20.00     Types: Cigarettes     Quit date:      Years since quittin.4   • Smokeless tobacco: Current User     Types: Snuff   Substance Use Topics   • Alcohol use: No   • Drug use: No         Objective   Vitals:    22 0819   BP: 112/71   BP Location: Right arm   Patient Position: Sitting   Cuff Size: Adult   Pulse: 63   Temp: 97.1 °F (36.2 °C)   TempSrc: Temporal   SpO2: 97%   Weight: 136 kg (298 lb 12.8 oz)   Height: 182.9 cm (72\")     Body mass index is 40.52 kg/m².    Constitutional:       General: Not in acute distress.     Appearance: Healthy appearance. Well-developed and not in distress. Not diaphoretic.   Eyes:      Conjunctiva/sclera: Conjunctivae normal.      Pupils: Pupils are equal, round, and reactive to light.   HENT:      Head: Normocephalic and atraumatic.   Neck:      Vascular: No carotid bruit or JVD.   Pulmonary:      Effort: Pulmonary effort is normal. No respiratory distress.      Breath sounds: Normal breath sounds.   Cardiovascular:      Normal rate. Regular rhythm.   Skin:     General: Skin is cool.   Neurological:      Mental Status: Alert, oriented to person, place, and time and oriented to person, place and time.         Lab Results   Component Value Date     10/03/2016    K 4.6 10/03/2016     10/03/2016    CO2 25 10/03/2016    BUN 10 10/03/2016    " CREATININE 0.89 10/03/2016    GLUCOSE 116 (H) 10/03/2016    CALCIUM 8.8 10/03/2016    AST 19 10/03/2016    ALT 26 10/03/2016    ALKPHOS 90 10/03/2016    LABIL2 1.6 10/03/2016     No results found for: CKTOTAL  Lab Results   Component Value Date    WBC 6.0 07/25/2014    HGB 15.3 07/25/2014    HCT 44.1 07/25/2014     07/25/2014     No results found for: INR  No results found for: MG  Lab Results   Component Value Date    CHLPL 135 10/03/2016    TRIG 87 10/03/2016    HDL 42 10/03/2016    LDL 76 10/03/2016      No results found for: BNP    During this visit the following were done:  Labs Reviewed []    Labs Ordered []    Radiology Reports Reviewed []    Radiology Ordered []    PCP Records Reviewed []    Referring Provider Records Reviewed []    ER Records Reviewed []    Hospital Records Reviewed []    History Obtained From Family []    Radiology Images Reviewed []    Other Reviewed []    Records Requested []         ECG 12 Lead    Date/Time: 6/6/2022 8:12 AM  Performed by: Lucian Carty PA-C  Authorized by: Lucian Carty PA-C   Comparison: compared with previous ECG   Similar to previous ECG  Rhythm: sinus rhythm  Conduction: conduction normal  ST Segments: ST segments normal    Clinical impression: normal ECG            Assessment & Plan    Diagnosis Plan   1. Atherosclerotic cardiovascular disease     2. Dyslipidemia. ON PRAVASTATIN     3. Essential hypertension            Recommendations:  1. ASCVD  1. No recent anginal symptoms.  Continue current regimen with aspirin, lisinopril, metoprolol, and pravastatin.      Return in about 1 year (around 6/6/2023).    As always, I appreciate very much the opportunity to participate in the cardiovascular care of your patients.      With Best Regards,    Lucian Carty PA-C

## 2022-06-06 NOTE — TELEPHONE ENCOUNTER
----- Message from Lucian Carty PA-C sent at 6/6/2022  8:44 AM EDT -----  Can we get recent labs from pcp?     REQUESTED

## 2023-06-08 ENCOUNTER — OFFICE VISIT (OUTPATIENT)
Dept: CARDIOLOGY | Facility: CLINIC | Age: 58
End: 2023-06-08
Payer: MEDICARE

## 2023-06-08 ENCOUNTER — TELEPHONE (OUTPATIENT)
Dept: CARDIOLOGY | Facility: CLINIC | Age: 58
End: 2023-06-08
Payer: MEDICARE

## 2023-06-08 VITALS
OXYGEN SATURATION: 96 % | DIASTOLIC BLOOD PRESSURE: 67 MMHG | HEIGHT: 72 IN | BODY MASS INDEX: 37.82 KG/M2 | HEART RATE: 70 BPM | WEIGHT: 279.2 LBS | SYSTOLIC BLOOD PRESSURE: 113 MMHG

## 2023-06-08 DIAGNOSIS — I10 ESSENTIAL HYPERTENSION: Primary | ICD-10-CM

## 2023-06-08 DIAGNOSIS — I25.10 ATHEROSCLEROTIC CARDIOVASCULAR DISEASE: ICD-10-CM

## 2023-06-08 DIAGNOSIS — E78.5 DYSLIPIDEMIA: ICD-10-CM

## 2023-06-08 PROCEDURE — 99213 OFFICE O/P EST LOW 20 MIN: CPT | Performed by: PHYSICIAN ASSISTANT

## 2023-06-08 PROCEDURE — 1159F MED LIST DOCD IN RCRD: CPT | Performed by: PHYSICIAN ASSISTANT

## 2023-06-08 PROCEDURE — 3078F DIAST BP <80 MM HG: CPT | Performed by: PHYSICIAN ASSISTANT

## 2023-06-08 PROCEDURE — 3074F SYST BP LT 130 MM HG: CPT | Performed by: PHYSICIAN ASSISTANT

## 2023-06-08 PROCEDURE — 93000 ELECTROCARDIOGRAM COMPLETE: CPT | Performed by: PHYSICIAN ASSISTANT

## 2023-06-08 PROCEDURE — 1160F RVW MEDS BY RX/DR IN RCRD: CPT | Performed by: PHYSICIAN ASSISTANT

## 2023-06-08 RX ORDER — TIRZEPATIDE 2.5 MG/.5ML
INJECTION, SOLUTION SUBCUTANEOUS
COMMUNITY
Start: 2023-05-12

## 2023-06-08 NOTE — TELEPHONE ENCOUNTER
Requested.     ----- Message from Lucian Carty PA-C sent at 6/8/2023 10:44 AM EDT -----  Cna we get recent labs from pcp

## 2023-06-08 NOTE — PROGRESS NOTES
Mellissa Serrano APRN  Eliu Guzman  1965 06/08/2023    Patient Active Problem List   Diagnosis    Skin lesion of face    Hyperkeratosis    Atherosclerotic cardiovascular disease    Essential hypertension    Dyslipidemia. ON PRAVASTATIN       Dear Mellissa Serrano APRN:    Subjective     History of Present Illness:    Chief Complaint   Patient presents with    Follow-up     ROUTINE       Eliu Guzman is a pleasant 57 y.o. male with a past medical history significant for ASCVD status post CABG, dyslipidemia, essential hypertension.  He comes in today for routine cardiology follow-up.     Mr. Guzman has no complaints with open questions.  Upon further questioning he still denies any chest pains, shortness of breath, palpitations, dizziness, or syncope.  Blood pressure has been well controlled.        No Known Allergies:      Current Outpatient Medications:     albuterol (PROVENTIL HFA;VENTOLIN HFA) 108 (90 BASE) MCG/ACT inhaler, Inhale 2 puffs Every 4 (Four) Hours As Needed for Wheezing., Disp: , Rfl:     aspirin 81 MG EC tablet, Take 1 tablet by mouth Daily., Disp: , Rfl:     cholecalciferol (VITAMIN D3) 1000 units tablet, Take 1 tablet by mouth Daily., Disp: , Rfl:     famotidine (PEPCID) 20 MG tablet, Take 1 tablet by mouth Daily., Disp: , Rfl:     levothyroxine (SYNTHROID, LEVOTHROID) 25 MCG tablet, Take 1 tablet by mouth Daily., Disp: , Rfl:     lisinopril (PRINIVIL,ZESTRIL) 20 MG tablet, Take 1 tablet by mouth Daily., Disp: , Rfl:     metFORMIN ER (GLUCOPHAGE-XR) 500 MG 24 hr tablet, TAKE ONE TABLET BY MOUTH EVERY NIGHT AT BEDTIME FOR BLOOD SUGAR, Disp: , Rfl:     metoprolol tartrate (LOPRESSOR) 50 MG tablet, Take 1 tablet by mouth 2 (Two) Times a Day., Disp: , Rfl:     Mounjaro 2.5 MG/0.5ML solution pen-injector, INJECT 2.5MG SUBCUTANEOUSLY EVERY WEEK, Disp: , Rfl:     nitroglycerin (NITROSTAT) 0.4 MG SL tablet, Place 1 tablet under the tongue Every 5 (Five) Minutes As Needed for Chest Pain.  "Take no more than 3 doses in 15 minutes., Disp: 25 tablet, Rfl: 3    pravastatin (PRAVACHOL) 40 MG tablet, Take 1 tablet by mouth Daily., Disp: , Rfl:     vitamin C (ASCORBIC ACID) 500 MG tablet, Take 1 tablet by mouth Daily., Disp: , Rfl:     The following portions of the patient's history were reviewed and updated as appropriate: allergies, current medications, past family history, past medical history, past social history, past surgical history and problem list.    Social History     Tobacco Use    Smoking status: Former     Packs/day: 1.00     Years: 20.00     Pack years: 20.00     Types: Cigarettes     Quit date:      Years since quittin.4    Smokeless tobacco: Current     Types: Snuff   Vaping Use    Vaping Use: Never used   Substance Use Topics    Alcohol use: No    Drug use: No         Objective   Vitals:    23 1018   BP: 113/67   Pulse: 70   SpO2: 96%   Weight: 127 kg (279 lb 3.2 oz)   Height: 182.9 cm (72\")     Body mass index is 37.87 kg/m².    Constitutional:       General: Not in acute distress.     Appearance: Healthy appearance. Well-developed and not in distress. Not diaphoretic.   Eyes:      Conjunctiva/sclera: Conjunctivae normal.      Pupils: Pupils are equal, round, and reactive to light.   HENT:      Head: Normocephalic and atraumatic.   Neck:      Vascular: No carotid bruit or JVD.   Pulmonary:      Effort: Pulmonary effort is normal. No respiratory distress.      Breath sounds: Normal breath sounds.   Cardiovascular:      Normal rate. Regular rhythm.   Edema:     Peripheral edema absent.   Skin:     General: Skin is cool.   Neurological:      Mental Status: Alert, oriented to person, place, and time and oriented to person, place and time.       Lab Results   Component Value Date     10/03/2016    K 4.6 10/03/2016     10/03/2016    CO2 25 10/03/2016    BUN 10 10/03/2016    CREATININE 0.89 10/03/2016    GLUCOSE 116 (H) 10/03/2016    CALCIUM 8.8 10/03/2016    AST 19 " 10/03/2016    ALT 26 10/03/2016    ALKPHOS 90 10/03/2016    LABIL2 1.6 10/03/2016     No results found for: CKTOTAL  Lab Results   Component Value Date    WBC 6.0 07/25/2014    HGB 15.3 07/25/2014    HCT 44.1 07/25/2014     07/25/2014     No results found for: INR  No results found for: MG  Lab Results   Component Value Date    CHLPL 135 10/03/2016    TRIG 87 10/03/2016    HDL 42 10/03/2016    LDL 76 10/03/2016      No results found for: BNP    During this visit the following were done:  Labs Reviewed []    Labs Ordered []    Radiology Reports Reviewed []    Radiology Ordered []    PCP Records Reviewed []    Referring Provider Records Reviewed []    ER Records Reviewed []    Hospital Records Reviewed []    History Obtained From Family []    Radiology Images Reviewed []    Other Reviewed []    Records Requested []         ECG 12 Lead    Date/Time: 6/8/2023 10:18 AM  Performed by: Lucian Carty PA-C  Authorized by: Lucian Carty PA-C   Comparison: compared with previous ECG   Similar to previous ECG  Rhythm: sinus rhythm  Conduction: non-specific intraventricular conduction delay  Other findings: early repolarization    Clinical impression: non-specific ECG      Assessment & Plan    Diagnosis Plan   1. Essential hypertension        2. Dyslipidemia. ON PRAVASTATIN        3. Atherosclerotic cardiovascular disease                 Recommendations:  Essential hypertension  Currently well controlled continue lisinopril, metoprolol.  ASCVD  Denies any recurrent anginal symptoms.  Continue GDMT with aspirin, lisinopril, metoprolol, pravastatin.    Return in about 1 year (around 6/8/2024).    As always, I appreciate very much the opportunity to participate in the cardiovascular care of your patients.      With Best Regards,    Lucian Carty PA-C

## 2024-07-01 ENCOUNTER — TELEPHONE (OUTPATIENT)
Dept: CARDIOLOGY | Facility: CLINIC | Age: 59
End: 2024-07-01
Payer: MEDICARE

## 2024-07-01 ENCOUNTER — OFFICE VISIT (OUTPATIENT)
Dept: CARDIOLOGY | Facility: CLINIC | Age: 59
End: 2024-07-01
Payer: MEDICARE

## 2024-07-01 VITALS
WEIGHT: 275 LBS | HEART RATE: 72 BPM | BODY MASS INDEX: 37.25 KG/M2 | HEIGHT: 72 IN | SYSTOLIC BLOOD PRESSURE: 96 MMHG | DIASTOLIC BLOOD PRESSURE: 63 MMHG | OXYGEN SATURATION: 99 %

## 2024-07-01 DIAGNOSIS — I10 ESSENTIAL HYPERTENSION: ICD-10-CM

## 2024-07-01 DIAGNOSIS — I25.10 ATHEROSCLEROTIC CARDIOVASCULAR DISEASE: Primary | ICD-10-CM

## 2024-07-01 DIAGNOSIS — E78.5 DYSLIPIDEMIA: ICD-10-CM

## 2024-07-01 PROCEDURE — 99214 OFFICE O/P EST MOD 30 MIN: CPT | Performed by: PHYSICIAN ASSISTANT

## 2024-07-01 PROCEDURE — 1160F RVW MEDS BY RX/DR IN RCRD: CPT | Performed by: PHYSICIAN ASSISTANT

## 2024-07-01 PROCEDURE — 93000 ELECTROCARDIOGRAM COMPLETE: CPT | Performed by: PHYSICIAN ASSISTANT

## 2024-07-01 PROCEDURE — 1159F MED LIST DOCD IN RCRD: CPT | Performed by: PHYSICIAN ASSISTANT

## 2024-07-01 PROCEDURE — 3074F SYST BP LT 130 MM HG: CPT | Performed by: PHYSICIAN ASSISTANT

## 2024-07-01 PROCEDURE — 3078F DIAST BP <80 MM HG: CPT | Performed by: PHYSICIAN ASSISTANT

## 2024-07-01 RX ORDER — LISINOPRIL 10 MG/1
10 TABLET ORAL DAILY
Qty: 90 TABLET | Refills: 2 | Status: SHIPPED | OUTPATIENT
Start: 2024-07-01

## 2024-07-01 NOTE — PROGRESS NOTES
Mellissa Serrano APRN  Eliu Guzman  1965 07/01/2024    Patient Active Problem List   Diagnosis    Skin lesion of face    Hyperkeratosis    Atherosclerotic cardiovascular disease    Essential hypertension    Dyslipidemia. ON PRAVASTATIN       Dear Mellissa Serrano APRN:    Subjective     History of Present Illness:    Chief Complaint   Patient presents with    Follow-up     ROUTINE       Eliu Guzman is a pleasant 59 y.o. male with a past medical history significant for ASCVD status post CABG, dyslipidemia, essential hypertension.  He comes in today for routine cardiology follow-up.     Patient denies any chest pain, shortness of breath, palpitations, dizziness, syncope or near syncope. Blood pressure is borderline low today he denies any falls or near syncope.     No Known Allergies:      Current Outpatient Medications:     albuterol (PROVENTIL HFA;VENTOLIN HFA) 108 (90 BASE) MCG/ACT inhaler, Inhale 2 puffs Every 4 (Four) Hours As Needed for Wheezing., Disp: , Rfl:     aspirin 81 MG EC tablet, Take 1 tablet by mouth Daily., Disp: , Rfl:     cholecalciferol (VITAMIN D3) 1000 units tablet, Take 1 tablet by mouth Daily., Disp: , Rfl:     famotidine (PEPCID) 20 MG tablet, Take 1 tablet by mouth Daily., Disp: , Rfl:     levothyroxine (SYNTHROID, LEVOTHROID) 25 MCG tablet, Take 1 tablet by mouth Daily., Disp: , Rfl:     lisinopril (PRINIVIL,ZESTRIL) 10 MG tablet, Take 1 tablet by mouth Daily., Disp: 90 tablet, Rfl: 2    metFORMIN ER (GLUCOPHAGE-XR) 500 MG 24 hr tablet, TAKE ONE TABLET BY MOUTH EVERY NIGHT AT BEDTIME FOR BLOOD SUGAR, Disp: , Rfl:     metoprolol tartrate (LOPRESSOR) 50 MG tablet, Take 1 tablet by mouth 2 (Two) Times a Day., Disp: , Rfl:     Mounjaro 2.5 MG/0.5ML solution pen-injector, INJECT 2.5MG SUBCUTANEOUSLY EVERY WEEK, Disp: , Rfl:     nitroglycerin (NITROSTAT) 0.4 MG SL tablet, Place 1 tablet under the tongue Every 5 (Five) Minutes As Needed for Chest Pain. Take no more than 3 doses  "in 15 minutes., Disp: 25 tablet, Rfl: 3    pravastatin (PRAVACHOL) 40 MG tablet, Take 1 tablet by mouth Daily., Disp: , Rfl:     vitamin C (ASCORBIC ACID) 500 MG tablet, Take 1 tablet by mouth Daily., Disp: , Rfl:     The following portions of the patient's history were reviewed and updated as appropriate: allergies, current medications, past family history, past medical history, past social history, past surgical history and problem list.    Social History     Tobacco Use    Smoking status: Former     Current packs/day: 0.00     Average packs/day: 1 pack/day for 20.0 years (20.0 ttl pk-yrs)     Types: Cigarettes     Start date: 1994     Quit date: 2014     Years since quitting: 10.5     Passive exposure: Past    Smokeless tobacco: Current     Types: Snuff   Vaping Use    Vaping status: Never Used   Substance Use Topics    Alcohol use: No    Drug use: No         Objective   Vitals:    07/01/24 0948   BP: 96/63   Pulse: 72   SpO2: 99%   Weight: 125 kg (275 lb)   Height: 182.9 cm (72\")     Body mass index is 37.3 kg/m².    ROS    Constitutional:       General: Not in acute distress.     Appearance: Healthy appearance. Well-developed and not in distress. Not diaphoretic.   Eyes:      Conjunctiva/sclera: Conjunctivae normal.      Pupils: Pupils are equal, round, and reactive to light.   HENT:      Head: Normocephalic and atraumatic.   Neck:      Vascular: No carotid bruit or JVD.   Pulmonary:      Effort: Pulmonary effort is normal. No respiratory distress.      Breath sounds: Normal breath sounds.   Cardiovascular:      Normal rate. Regular rhythm.   Edema:     Peripheral edema absent.   Skin:     General: Skin is cool.   Neurological:      Mental Status: Alert, oriented to person, place, and time and oriented to person, place and time.         Lab Results   Component Value Date     10/03/2016    K 4.6 10/03/2016     10/03/2016    CO2 25 10/03/2016    BUN 10 10/03/2016    CREATININE 0.89 10/03/2016    " "GLUCOSE 116 (H) 10/03/2016    CALCIUM 8.8 10/03/2016    AST 19 10/03/2016    ALT 26 10/03/2016    ALKPHOS 90 10/03/2016    LABIL2 1.6 10/03/2016     No results found for: \"CKTOTAL\"  Lab Results   Component Value Date    WBC 6.0 07/25/2014    HGB 15.3 07/25/2014    HCT 44.1 07/25/2014     07/25/2014     No results found for: \"INR\"  No results found for: \"MG\"  Lab Results   Component Value Date    CHLPL 135 10/03/2016    TRIG 87 10/03/2016    HDL 42 10/03/2016    LDL 76 10/03/2016      No results found for: \"BNP\"    During this visit the following were done:  Labs Reviewed []    Labs Ordered []    Radiology Reports Reviewed []    Radiology Ordered []    PCP Records Reviewed []    Referring Provider Records Reviewed []    ER Records Reviewed []    Hospital Records Reviewed []    History Obtained From Family []    Radiology Images Reviewed []    Other Reviewed []    Records Requested []         ECG 12 Lead    Date/Time: 7/1/2024 9:49 AM  Performed by: Lucian Carty PA-C    Authorized by: Lucian Carty PA-C  Comparison: compared with previous ECG   Similar to previous ECG  Rhythm: sinus rhythm  Q waves: III    T inversion: III    Clinical impression: non-specific ECG        Assessment & Plan    Diagnosis Plan   1. Atherosclerotic cardiovascular disease  ECG 12 Lead      2. Essential hypertension  ECG 12 Lead      3. Dyslipidemia. ON PRAVASTATIN                 Recommendations:  ASCVD  Denies any anginal symptoms continue with current GDMT with aspirin, lisinopril, metoprolol, and pravastatin.  Dyslipidemia  Requested labs from PCP last LDL was above goal if still above goal we will consider higher intensity statin.  Essential hypertension  Borderline low will reduce dose of lisinopril to 10 mg    Return in about 1 year (around 7/1/2025).    As always, I appreciate very much the opportunity to participate in the cardiovascular care of your patients.      With Best Regards,    Lucian Carty PA-C          " 225.9

## 2024-07-01 NOTE — TELEPHONE ENCOUNTER
Requested.         ----- Message from Lucian Carty sent at 7/1/2024 10:15 AM EDT -----  Can we get labs from pcp

## 2025-05-06 RX ORDER — LISINOPRIL 10 MG/1
10 TABLET ORAL DAILY
Qty: 90 TABLET | Refills: 0 | Status: SHIPPED | OUTPATIENT
Start: 2025-05-06

## 2025-07-14 ENCOUNTER — TELEPHONE (OUTPATIENT)
Dept: CARDIOLOGY | Facility: CLINIC | Age: 60
End: 2025-07-14
Payer: MEDICARE

## 2025-07-14 ENCOUNTER — OFFICE VISIT (OUTPATIENT)
Dept: CARDIOLOGY | Facility: CLINIC | Age: 60
End: 2025-07-14
Payer: MEDICARE

## 2025-07-14 VITALS
SYSTOLIC BLOOD PRESSURE: 104 MMHG | BODY MASS INDEX: 37.65 KG/M2 | HEART RATE: 66 BPM | HEIGHT: 72 IN | OXYGEN SATURATION: 96 % | DIASTOLIC BLOOD PRESSURE: 67 MMHG | WEIGHT: 278 LBS

## 2025-07-14 DIAGNOSIS — E78.5 DYSLIPIDEMIA: ICD-10-CM

## 2025-07-14 DIAGNOSIS — I25.10 ATHEROSCLEROTIC CARDIOVASCULAR DISEASE: Primary | ICD-10-CM

## 2025-07-14 DIAGNOSIS — I10 ESSENTIAL HYPERTENSION: ICD-10-CM

## 2025-07-14 PROCEDURE — 3074F SYST BP LT 130 MM HG: CPT | Performed by: PHYSICIAN ASSISTANT

## 2025-07-14 PROCEDURE — 3078F DIAST BP <80 MM HG: CPT | Performed by: PHYSICIAN ASSISTANT

## 2025-07-14 PROCEDURE — 99214 OFFICE O/P EST MOD 30 MIN: CPT | Performed by: PHYSICIAN ASSISTANT

## 2025-07-14 PROCEDURE — 93000 ELECTROCARDIOGRAM COMPLETE: CPT | Performed by: PHYSICIAN ASSISTANT

## 2025-07-14 PROCEDURE — 1159F MED LIST DOCD IN RCRD: CPT | Performed by: PHYSICIAN ASSISTANT

## 2025-07-14 PROCEDURE — 1160F RVW MEDS BY RX/DR IN RCRD: CPT | Performed by: PHYSICIAN ASSISTANT

## 2025-07-14 RX ORDER — PRAVASTATIN SODIUM 20 MG
20 TABLET ORAL DAILY
Qty: 90 TABLET | Refills: 2 | Status: SHIPPED | OUTPATIENT
Start: 2025-07-14

## 2025-07-14 NOTE — PROGRESS NOTES
Mellissa Serrano APRN  Eliu Guzman  1965 07/14/2025    Patient Active Problem List   Diagnosis    Skin lesion of face    Hyperkeratosis    Atherosclerotic cardiovascular disease    Essential hypertension    Dyslipidemia       Dear Mellissa Serrano APRN:    Subjective     History of Present Illness:    Chief Complaint   Patient presents with    Follow-up     ROUTINE       Eliu Guzman is a pleasant 60 y.o. male with a past medical history significant for ASCVD status post CABG, dyslipidemia, essential hypertension.  He comes in today for routine cardiology follow-up.     Mr. Guzman comes in today he reports that he did start having some muscle cramps/joint pain that he attributed to his pravastatin he subsequently cut the dose in half and had resolution of the symptoms.  Otherwise from cardiac standpoint he does report about a month ago he had some chest pains that was present when he was riding mowing his lawn he reports that he would twist and this would improve.  He reports that this has not reoccurred in the last month. He denies shortness of breath, palpitations, and blood pressure has been well controlled.       No Known Allergies:      Current Outpatient Medications:     aspirin 81 MG EC tablet, Take 1 tablet by mouth Daily., Disp: , Rfl:     cholecalciferol (VITAMIN D3) 1000 units tablet, Take 1 tablet by mouth Daily., Disp: , Rfl:     famotidine (PEPCID) 20 MG tablet, Take 1 tablet by mouth Daily., Disp: , Rfl:     levothyroxine (SYNTHROID, LEVOTHROID) 25 MCG tablet, Take 1 tablet by mouth Daily., Disp: , Rfl:     lisinopril (PRINIVIL,ZESTRIL) 10 MG tablet, take one tablet by mouth every day for blood pressure (Patient taking differently: Take 2 tablets by mouth Daily. for blood pressure), Disp: 90 tablet, Rfl: 0    metFORMIN ER (GLUCOPHAGE-XR) 500 MG 24 hr tablet, TAKE ONE TABLET BY MOUTH EVERY NIGHT AT BEDTIME FOR BLOOD SUGAR, Disp: , Rfl:     metoprolol tartrate (LOPRESSOR) 50 MG tablet,  "Take 1 tablet by mouth 2 (Two) Times a Day., Disp: , Rfl:     Mounjaro 2.5 MG/0.5ML solution pen-injector, INJECT 2.5MG SUBCUTANEOUSLY EVERY WEEK, Disp: , Rfl:     nitroglycerin (NITROSTAT) 0.4 MG SL tablet, Place 1 tablet under the tongue Every 5 (Five) Minutes As Needed for Chest Pain. Take no more than 3 doses in 15 minutes., Disp: 25 tablet, Rfl: 3    pravastatin (PRAVACHOL) 20 MG tablet, Take 1 tablet by mouth Daily., Disp: 90 tablet, Rfl: 2    vitamin C (ASCORBIC ACID) 500 MG tablet, Take 1 tablet by mouth Daily., Disp: , Rfl:     albuterol (PROVENTIL HFA;VENTOLIN HFA) 108 (90 BASE) MCG/ACT inhaler, Inhale 2 puffs Every 4 (Four) Hours As Needed for Wheezing. (Patient not taking: Reported on 2025), Disp: , Rfl:     The following portions of the patient's history were reviewed and updated as appropriate: allergies, current medications, past family history, past medical history, past social history, past surgical history and problem list.    Social History     Tobacco Use    Smoking status: Former     Current packs/day: 0.00     Average packs/day: 1 pack/day for 20.0 years (20.0 ttl pk-yrs)     Types: Cigarettes     Start date:      Quit date:      Years since quittin.5     Passive exposure: Past    Smokeless tobacco: Current     Types: Snuff   Vaping Use    Vaping status: Never Used   Substance Use Topics    Alcohol use: No    Drug use: No         Objective   Vitals:    25 0942   BP: 104/67   Pulse: 66   SpO2: 96%   Weight: 126 kg (278 lb)   Height: 182.9 cm (72\")     Body mass index is 37.7 kg/m².    ROS    Constitutional:       General: Not in acute distress.     Appearance: Healthy appearance. Well-developed and not in distress. Not diaphoretic.   Eyes:      Conjunctiva/sclera: Conjunctivae normal.      Pupils: Pupils are equal, round, and reactive to light.   HENT:      Head: Normocephalic and atraumatic.   Neck:      Vascular: No carotid bruit or JVD.   Pulmonary:      Effort: " "Pulmonary effort is normal. No respiratory distress.      Breath sounds: Normal breath sounds.   Cardiovascular:      Normal rate. Regular rhythm.   Edema:     Peripheral edema absent.   Skin:     General: Skin is cool.   Neurological:      Mental Status: Alert, oriented to person, place, and time and oriented to person, place and time.         Lab Results   Component Value Date     10/03/2016    K 4.6 10/03/2016     10/03/2016    CO2 25 10/03/2016    BUN 10 10/03/2016    CREATININE 0.89 10/03/2016    GLUCOSE 116 (H) 10/03/2016    CALCIUM 8.8 10/03/2016    AST 19 10/03/2016    ALT 26 10/03/2016    ALKPHOS 90 10/03/2016     No results found for: \"CKTOTAL\"  Lab Results   Component Value Date    WBC 6.0 07/25/2014    HGB 15.3 07/25/2014    HCT 44.1 07/25/2014     07/25/2014     No results found for: \"INR\"  No results found for: \"MG\"  Lab Results   Component Value Date    CHLPL 135 10/03/2016    TRIG 87 10/03/2016    HDL 42 10/03/2016    LDL 76 10/03/2016      No results found for: \"BNP\"    During this visit the following were done:  Labs Reviewed []    Labs Ordered []    Radiology Reports Reviewed []    Radiology Ordered []    PCP Records Reviewed []    Referring Provider Records Reviewed []    ER Records Reviewed []    Hospital Records Reviewed []    History Obtained From Family []    Radiology Images Reviewed []    Other Reviewed []    Records Requested []         ECG 12 Lead    Date/Time: 7/14/2025 10:05 AM  Performed by: Lucian Carty PA-C    Authorized by: Lucian Carty PA-C  Comparison: compared with previous ECG   Similar to previous ECG  Rhythm: sinus rhythm  ST Segments: ST segments normal    Clinical impression: normal ECG          Assessment & Plan    Diagnosis Plan   1. Atherosclerotic cardiovascular disease  ECG 12 Lead    Lipid Panel      2. Essential hypertension        3. Dyslipidemia                 Recommendations:  CAD  Overall stable he does report 1 episode of chest pain " occurred while he was riding mowing his lawn this was over a month ago with no reoccurrence and he has continued mowing his lawn since.  Since there is been no reoccurrence we will monitor for now however I asked him to reach out to our office if this becomes recurrent.  Dyslipidemia  He recently lowered his dose of simvastatin will request lipid panel from PCP if not at goal we will change pravastatin to Crestor.  Essential hypertension  Currently well-controlled.    Return in about 3 months (around 10/14/2025).    As always, I appreciate very much the opportunity to participate in the cardiovascular care of your patients.      With Best Regards,    Lucian Carty PA-C

## 2025-07-14 NOTE — TELEPHONE ENCOUNTER
Requested.     ----- Message from Lucina Carty sent at 7/14/2025 10:07 AM EDT -----  Can we get labs from pcp